# Patient Record
Sex: FEMALE | Race: BLACK OR AFRICAN AMERICAN | NOT HISPANIC OR LATINO | Employment: OTHER | URBAN - METROPOLITAN AREA
[De-identification: names, ages, dates, MRNs, and addresses within clinical notes are randomized per-mention and may not be internally consistent; named-entity substitution may affect disease eponyms.]

---

## 2023-03-06 ENCOUNTER — OFFICE VISIT (OUTPATIENT)
Dept: PRIMARY CARE | Facility: CLINIC | Age: 73
End: 2023-03-06
Payer: COMMERCIAL

## 2023-03-06 VITALS
OXYGEN SATURATION: 98 % | BODY MASS INDEX: 28.93 KG/M2 | HEART RATE: 103 BPM | DIASTOLIC BLOOD PRESSURE: 94 MMHG | SYSTOLIC BLOOD PRESSURE: 161 MMHG | HEIGHT: 62 IN | TEMPERATURE: 97.7 F | WEIGHT: 157.2 LBS

## 2023-03-06 DIAGNOSIS — R73.09 ELEVATED GLUCOSE: ICD-10-CM

## 2023-03-06 DIAGNOSIS — I10 HYPERTENSION, UNSPECIFIED TYPE: ICD-10-CM

## 2023-03-06 DIAGNOSIS — Z76.89 ESTABLISHING CARE WITH NEW DOCTOR, ENCOUNTER FOR: Primary | ICD-10-CM

## 2023-03-06 LAB
ALANINE AMINOTRANSFERASE (SGPT) (U/L) IN SER/PLAS: 14 U/L (ref 7–45)
ALBUMIN (G/DL) IN SER/PLAS: 4.3 G/DL (ref 3.4–5)
ALKALINE PHOSPHATASE (U/L) IN SER/PLAS: 95 U/L (ref 33–136)
ANION GAP IN SER/PLAS: 11 MMOL/L (ref 10–20)
ASPARTATE AMINOTRANSFERASE (SGOT) (U/L) IN SER/PLAS: 17 U/L (ref 9–39)
BILIRUBIN TOTAL (MG/DL) IN SER/PLAS: 0.6 MG/DL (ref 0–1.2)
CALCIUM (MG/DL) IN SER/PLAS: 9.9 MG/DL (ref 8.6–10.6)
CARBON DIOXIDE, TOTAL (MMOL/L) IN SER/PLAS: 30 MMOL/L (ref 21–32)
CHLORIDE (MMOL/L) IN SER/PLAS: 106 MMOL/L (ref 98–107)
CHOLESTEROL (MG/DL) IN SER/PLAS: 166 MG/DL (ref 0–199)
CHOLESTEROL IN HDL (MG/DL) IN SER/PLAS: 44.3 MG/DL
CHOLESTEROL/HDL RATIO: 3.7
CREATININE (MG/DL) IN SER/PLAS: 0.72 MG/DL (ref 0.5–1.05)
ERYTHROCYTE DISTRIBUTION WIDTH (RATIO) BY AUTOMATED COUNT: 12.8 % (ref 11.5–14.5)
ERYTHROCYTE MEAN CORPUSCULAR HEMOGLOBIN CONCENTRATION (G/DL) BY AUTOMATED: 29.8 G/DL (ref 32–36)
ERYTHROCYTE MEAN CORPUSCULAR VOLUME (FL) BY AUTOMATED COUNT: 95 FL (ref 80–100)
ERYTHROCYTES (10*6/UL) IN BLOOD BY AUTOMATED COUNT: 3.97 X10E12/L (ref 4–5.2)
GFR FEMALE: 89 ML/MIN/1.73M2
GLUCOSE (MG/DL) IN SER/PLAS: 146 MG/DL (ref 74–99)
HEMATOCRIT (%) IN BLOOD BY AUTOMATED COUNT: 37.6 % (ref 36–46)
HEMOGLOBIN (G/DL) IN BLOOD: 11.2 G/DL (ref 12–16)
LDL: 96 MG/DL (ref 0–99)
LEUKOCYTES (10*3/UL) IN BLOOD BY AUTOMATED COUNT: 7.2 X10E9/L (ref 4.4–11.3)
MAGNESIUM (MG/DL) IN SER/PLAS: 2.04 MG/DL (ref 1.6–2.4)
NRBC (PER 100 WBCS) BY AUTOMATED COUNT: 0 /100 WBC (ref 0–0)
PLATELETS (10*3/UL) IN BLOOD AUTOMATED COUNT: 246 X10E9/L (ref 150–450)
POTASSIUM (MMOL/L) IN SER/PLAS: 3.9 MMOL/L (ref 3.5–5.3)
PROTEIN TOTAL: 6.9 G/DL (ref 6.4–8.2)
SODIUM (MMOL/L) IN SER/PLAS: 143 MMOL/L (ref 136–145)
THYROTROPIN (MIU/L) IN SER/PLAS BY DETECTION LIMIT <= 0.05 MIU/L: 3.58 MIU/L (ref 0.44–3.98)
TRIGLYCERIDE (MG/DL) IN SER/PLAS: 130 MG/DL (ref 0–149)
UREA NITROGEN (MG/DL) IN SER/PLAS: 16 MG/DL (ref 6–23)
VLDL: 26 MG/DL (ref 0–40)

## 2023-03-06 PROCEDURE — 3008F BODY MASS INDEX DOCD: CPT

## 2023-03-06 PROCEDURE — 85027 COMPLETE CBC AUTOMATED: CPT

## 2023-03-06 PROCEDURE — 80053 COMPREHEN METABOLIC PANEL: CPT

## 2023-03-06 PROCEDURE — 80061 LIPID PANEL: CPT

## 2023-03-06 PROCEDURE — 3077F SYST BP >= 140 MM HG: CPT

## 2023-03-06 PROCEDURE — 3080F DIAST BP >= 90 MM HG: CPT

## 2023-03-06 PROCEDURE — 83735 ASSAY OF MAGNESIUM: CPT

## 2023-03-06 PROCEDURE — 84443 ASSAY THYROID STIM HORMONE: CPT

## 2023-03-06 PROCEDURE — 36415 COLL VENOUS BLD VENIPUNCTURE: CPT

## 2023-03-06 PROCEDURE — 99204 OFFICE O/P NEW MOD 45 MIN: CPT

## 2023-03-06 RX ORDER — CHLORTHALIDONE 25 MG/1
12.5 TABLET ORAL DAILY
Qty: 15 TABLET | Refills: 0 | Status: SHIPPED | OUTPATIENT
Start: 2023-03-06 | End: 2023-04-24 | Stop reason: SDUPTHER

## 2023-03-06 ASSESSMENT — ENCOUNTER SYMPTOMS
LIGHT-HEADEDNESS: 1
JOINT SWELLING: 1
HEADACHES: 1

## 2023-03-06 NOTE — PROGRESS NOTES
"Subjective   Patient ID: Betsy Ragland is a 72 y.o. female who presents for establishing care as well as hypertension    HPI  Betsy is presenting today to establish care due to distance to previous provider as well as concerns for running out of her hypertension medication/pharmacy losing her prescription. She has only been on losartan 100mg every day for one week with home readings or 200/100  She presented to the ED recently for concerns of a possible MI due to left sided finger pain that radiates all the way to neck and shoulder.   She was discharged with no concerns of MI however with possible finger pain etiology of a past fracture that healed incorrectly causing joint pain. She already has a referral to a surgeon for follow up      Review of Systems   Musculoskeletal:  Positive for joint swelling.   Neurological:  Positive for light-headedness and headaches.   All other systems have been reviewed and are negative.     BP (!) 161/94 (BP Location: Right arm)   Pulse 103   Temp 36.5 °C (97.7 °F) (Temporal)   Ht 1.585 m (5' 2.4\")   Wt 71.3 kg (157 lb 3.2 oz)   SpO2 98%   BMI 28.38 kg/m²   Objective   Physical Exam  Constitutional:       Appearance: Normal appearance.   HENT:      Head: Normocephalic and atraumatic.      Right Ear: External ear normal.      Left Ear: External ear normal.      Nose: Nose normal.   Eyes:      Extraocular Movements: Extraocular movements intact.      Conjunctiva/sclera: Conjunctivae normal.   Cardiovascular:      Rate and Rhythm: Normal rate and regular rhythm.      Pulses: Normal pulses.      Heart sounds: Normal heart sounds.   Pulmonary:      Effort: Pulmonary effort is normal.      Breath sounds: Normal breath sounds.   Abdominal:      General: Abdomen is flat. Bowel sounds are normal.      Palpations: Abdomen is soft.   Musculoskeletal:         General: Normal range of motion.      Cervical back: Normal range of motion.      Comments: Left middle finger deformity    Skin:     " General: Skin is warm and dry.      Capillary Refill: Capillary refill takes less than 2 seconds.   Neurological:      General: No focal deficit present.      Mental Status: She is alert and oriented to person, place, and time.   Psychiatric:         Mood and Affect: Mood normal.         Behavior: Behavior normal.         Thought Content: Thought content normal.         Judgment: Judgment normal.         Assessment/Plan     Establish care:   Lab work ordered listed below. Will follow up with results and adjust medications, if necessary.     2. HTN, uncontrolled: has been out of meds.   Continue Losartan 100mg QD  Start Chlorthalidone 12.5mg QD  Start bp log for 1 week  Return to clinic on Friday for BP follow up and evaluation  If BP still high at that time, consider restarting Amlodipine.    I have personally reviewed all available pertinent labs, imaging, and consult notes with the patient.     All questions and concerns were addressed. Patient verbalizes understanding instructions and agrees with established plan of care.     Patient seen and discussed with Dr. Chase Cline MD     Problem List Items Addressed This Visit    None  Visit Diagnoses       Hypertension, unspecified type    -  Primary    Relevant Medications    chlorthalidone (Hygroton) 25 mg tablet    Other Relevant Orders    CBC    Comprehensive Metabolic Panel    Hemoglobin A1C    Lipid Panel    Magnesium    TSH with reflex to Free T4 if abnormal    Establishing care with new doctor, encounter for        Relevant Orders    CBC    Comprehensive Metabolic Panel    Hemoglobin A1C    Lipid Panel    Magnesium    TSH with reflex to Free T4 if abnormal    BMI 28.0-28.9,adult        Relevant Orders    CBC    Comprehensive Metabolic Panel    Hemoglobin A1C    Lipid Panel    Magnesium    TSH with reflex to Free T4 if abnormal    Elevated glucose        Relevant Orders    Hemoglobin A1C

## 2023-03-10 ENCOUNTER — OFFICE VISIT (OUTPATIENT)
Dept: PRIMARY CARE | Facility: CLINIC | Age: 73
End: 2023-03-10
Payer: COMMERCIAL

## 2023-03-10 VITALS
TEMPERATURE: 97.6 F | HEART RATE: 78 BPM | OXYGEN SATURATION: 99 % | RESPIRATION RATE: 16 BRPM | SYSTOLIC BLOOD PRESSURE: 150 MMHG | DIASTOLIC BLOOD PRESSURE: 90 MMHG

## 2023-03-10 DIAGNOSIS — I10 HYPERTENSION, UNSPECIFIED TYPE: Primary | ICD-10-CM

## 2023-03-10 PROCEDURE — 3008F BODY MASS INDEX DOCD: CPT

## 2023-03-10 PROCEDURE — 99213 OFFICE O/P EST LOW 20 MIN: CPT

## 2023-03-10 PROCEDURE — 3080F DIAST BP >= 90 MM HG: CPT

## 2023-03-10 PROCEDURE — 3077F SYST BP >= 140 MM HG: CPT

## 2023-03-10 RX ORDER — LOSARTAN POTASSIUM 100 MG/1
100 TABLET ORAL DAILY
COMMUNITY
Start: 2022-11-30 | End: 2023-05-05

## 2023-03-10 NOTE — PROGRESS NOTES
Subjective   Patient ID: Betsy Ragland is a 72 y.o. female who presents for BP follow up after starting a new regimen on Monday.     HPI     Betsy is presenting today for BP follow up, she brought her BP log and demonstrates a steady decrease in BP levels.       Review of Systems   All other systems reviewed and are negative.      Objective   There were no vitals taken for this visit.    Physical Exam  Constitutional:       Appearance: Normal appearance.   HENT:      Head: Normocephalic and atraumatic.      Right Ear: External ear normal.      Left Ear: External ear normal.      Nose: Nose normal. No congestion or rhinorrhea.   Eyes:      General: No scleral icterus.     Extraocular Movements: Extraocular movements intact.      Conjunctiva/sclera: Conjunctivae normal.   Cardiovascular:      Rate and Rhythm: Normal rate and regular rhythm.      Pulses: Normal pulses.      Heart sounds: Normal heart sounds.   Pulmonary:      Effort: Pulmonary effort is normal.   Musculoskeletal:         General: Normal range of motion.   Skin:     General: Skin is warm and dry.      Capillary Refill: Capillary refill takes less than 2 seconds.   Neurological:      General: No focal deficit present.      Mental Status: She is alert and oriented to person, place, and time. Mental status is at baseline.   Psychiatric:         Mood and Affect: Mood normal.         Behavior: Behavior normal.         Thought Content: Thought content normal.         Judgment: Judgment normal.         Assessment/Plan     Hypertension:   Continue the Losartan 100mg every day  Continue Chlorthalidone 12.5mg every day   Continue the BP log for one more week and return to clinic for evaluation for the continued decrease  Betsy does not feel any side effects such as dizziness, light headed or feelings of loss of consciousness  Discussed that should she should return to clinic should any side effects should occur     I have personally reviewed all available pertinent  labs, imaging, and consult notes with the patient.     All questions and concerns were addressed. Patient verbalizes understanding instructions and agrees with established plan of care.     Patient seen and discussed with Dr. Chase Cline MD

## 2023-03-17 ENCOUNTER — OFFICE VISIT (OUTPATIENT)
Dept: PRIMARY CARE | Facility: CLINIC | Age: 73
End: 2023-03-17
Payer: COMMERCIAL

## 2023-03-17 VITALS — SYSTOLIC BLOOD PRESSURE: 142 MMHG | HEART RATE: 95 BPM | DIASTOLIC BLOOD PRESSURE: 78 MMHG | OXYGEN SATURATION: 96 %

## 2023-03-17 DIAGNOSIS — I10 HYPERTENSION, UNSPECIFIED TYPE: Primary | ICD-10-CM

## 2023-03-17 PROCEDURE — 3008F BODY MASS INDEX DOCD: CPT

## 2023-03-17 PROCEDURE — 3078F DIAST BP <80 MM HG: CPT

## 2023-03-17 PROCEDURE — 3077F SYST BP >= 140 MM HG: CPT

## 2023-03-17 PROCEDURE — 99213 OFFICE O/P EST LOW 20 MIN: CPT

## 2023-03-17 NOTE — PROGRESS NOTES
Subjective   Patient ID: Betsy Ragland is a 72 y.o. female who presents for No chief complaint on file..    HPI   Betsy is presenting for BP follow up after last visit.   She presented with BP log that shows an average of 150/85   She is not experiencing any symptoms from the medication as well as following a low salt diet actively      Review of Systems   All other systems reviewed and are negative.      Objective   There were no vitals taken for this visit.    Physical Exam  Constitutional:       Appearance: Normal appearance.   HENT:      Head: Normocephalic and atraumatic.      Right Ear: External ear normal.      Left Ear: External ear normal.      Nose: Nose normal.   Eyes:      Extraocular Movements: Extraocular movements intact.      Conjunctiva/sclera: Conjunctivae normal.   Cardiovascular:      Rate and Rhythm: Normal rate and regular rhythm.      Pulses: Normal pulses.      Heart sounds: Normal heart sounds.   Pulmonary:      Effort: Pulmonary effort is normal.   Musculoskeletal:         General: Normal range of motion.      Cervical back: Normal range of motion.   Skin:     General: Skin is warm and dry.   Neurological:      General: No focal deficit present.      Mental Status: She is alert and oriented to person, place, and time.   Psychiatric:         Mood and Affect: Mood normal.         Behavior: Behavior normal.         Thought Content: Thought content normal.         Judgment: Judgment normal.         Assessment/Plan   Diagnoses and all orders for this visit:  Hypertension, unspecified type  -     Comprehensive metabolic panel; Future  -     Magnesium; Future       Follow up in 1 month with current regimen.   BP was 142/78 in clinic on repeat check.   She is not experiencing any symptoms on current regimen   Continue low salt diet  Labs to be rechecked in one month for continued monitoring after making antihypertensives.    I have personally reviewed all available pertinent labs, imaging, and  consult notes with the patient.     All questions and concerns were addressed. Patient verbalizes understanding instructions and agrees with established plan of care.     Patient seen and discussed with Dr. Chase Cline MD

## 2023-04-24 DIAGNOSIS — I10 HYPERTENSION, UNSPECIFIED TYPE: ICD-10-CM

## 2023-04-24 RX ORDER — CHLORTHALIDONE 25 MG/1
12.5 TABLET ORAL DAILY
Qty: 15 TABLET | Refills: 0 | Status: SHIPPED | OUTPATIENT
Start: 2023-04-24 | End: 2023-05-05

## 2023-05-05 ENCOUNTER — OFFICE VISIT (OUTPATIENT)
Dept: PRIMARY CARE | Facility: CLINIC | Age: 73
End: 2023-05-05
Payer: COMMERCIAL

## 2023-05-05 VITALS — SYSTOLIC BLOOD PRESSURE: 145 MMHG | DIASTOLIC BLOOD PRESSURE: 78 MMHG | HEART RATE: 83 BPM | OXYGEN SATURATION: 98 %

## 2023-05-05 DIAGNOSIS — I10 HYPERTENSION, UNSPECIFIED TYPE: Primary | ICD-10-CM

## 2023-05-05 PROCEDURE — 3077F SYST BP >= 140 MM HG: CPT

## 2023-05-05 PROCEDURE — 3078F DIAST BP <80 MM HG: CPT

## 2023-05-05 PROCEDURE — 3008F BODY MASS INDEX DOCD: CPT

## 2023-05-05 PROCEDURE — 1159F MED LIST DOCD IN RCRD: CPT

## 2023-05-05 PROCEDURE — 99213 OFFICE O/P EST LOW 20 MIN: CPT

## 2023-05-05 PROCEDURE — 1160F RVW MEDS BY RX/DR IN RCRD: CPT

## 2023-05-05 RX ORDER — OLMESARTAN MEDOXOMIL 40 MG/1
40 TABLET ORAL DAILY
Qty: 30 TABLET | Refills: 5 | Status: SHIPPED | OUTPATIENT
Start: 2023-05-05 | End: 2023-09-26 | Stop reason: ALTCHOICE

## 2023-05-05 NOTE — PROGRESS NOTES
Subjective   Patient ID: Betsy Ragland is a 72 y.o. female who presents for HTN follow up.    HPI     Betsy has been taking half a tablet of chlorthalidone and 100mg losartan regularly and has not run out of her medication.  She has ran out of the chlorthalidone last week. She measures her bp at home with her home machine, and checks the memory to compare her readings. She reports that her readings have been steady approximately 140s/70s.   No symptoms from dizziness, no syncopal symptoms, no loss of balance. She has been monitoring her weight and food intake to decrease salt intake.  Feels better in general since implementing a better diet and nutrition regimen.  Betsy has no new concerns to share, no pain.       Review of Systems   All other systems reviewed and are negative.      Objective   /78 (BP Location: Right arm, Patient Position: Sitting, BP Cuff Size: Adult)   Pulse 83   SpO2 98%     Physical Exam  Constitutional:       Appearance: Normal appearance.   HENT:      Head: Normocephalic and atraumatic.      Right Ear: External ear normal.      Left Ear: External ear normal.      Nose: Nose normal. No congestion or rhinorrhea.   Eyes:      General: No scleral icterus.     Extraocular Movements: Extraocular movements intact.      Conjunctiva/sclera: Conjunctivae normal.   Cardiovascular:      Rate and Rhythm: Normal rate and regular rhythm.      Pulses: Normal pulses.      Heart sounds: Normal heart sounds.   Pulmonary:      Effort: Pulmonary effort is normal.      Breath sounds: Normal breath sounds.   Musculoskeletal:         General: Normal range of motion.      Cervical back: Normal range of motion and neck supple.   Skin:     General: Skin is warm and dry.      Capillary Refill: Capillary refill takes less than 2 seconds.   Neurological:      General: No focal deficit present.      Mental Status: She is alert and oriented to person, place, and time. Mental status is at baseline.   Psychiatric:          Mood and Affect: Mood normal.         Behavior: Behavior normal.         Thought Content: Thought content normal.         Judgment: Judgment normal.         Assessment/Plan   Diagnoses and all orders for this visit:  Hypertension, unspecified type  -     olmesartan (BENIcar) 40 mg tablet; Take 1 tablet (40 mg) by mouth once daily.    Betsy's blood pressure has not changed with the 1 week of not taking her chlorthalidone and her blood pressure continues not to meet the target of below 140 systolic however her diastolic is meeting the goal.   Her previous regimen has been discontinued and starting her on Olmesartan 40mg daily.   We'd like to see her back in 1 month to monitor the change in her regimen.   Please continue to check your bp at home with the monitor and please let the office know should any new symptoms arise particularly dizziness, loss of balance or syncopal symptoms.    I have personally reviewed all available pertinent labs, imaging, and consult notes with the patient.     All questions and concerns were addressed. Patient verbalizes understanding instructions and agrees with established plan of care.     Patient seen and discussed with Dr. Stone

## 2023-09-26 ENCOUNTER — OFFICE VISIT (OUTPATIENT)
Dept: PRIMARY CARE | Facility: CLINIC | Age: 73
End: 2023-09-26
Payer: COMMERCIAL

## 2023-09-26 ENCOUNTER — LAB (OUTPATIENT)
Dept: LAB | Facility: LAB | Age: 73
End: 2023-09-26
Payer: COMMERCIAL

## 2023-09-26 VITALS
SYSTOLIC BLOOD PRESSURE: 173 MMHG | HEIGHT: 63 IN | OXYGEN SATURATION: 98 % | WEIGHT: 150.2 LBS | DIASTOLIC BLOOD PRESSURE: 90 MMHG | BODY MASS INDEX: 26.61 KG/M2 | RESPIRATION RATE: 16 BRPM | HEART RATE: 80 BPM

## 2023-09-26 DIAGNOSIS — I10 HYPERTENSION, UNSPECIFIED TYPE: Primary | ICD-10-CM

## 2023-09-26 DIAGNOSIS — T78.40XA ALLERGY, INITIAL ENCOUNTER: ICD-10-CM

## 2023-09-26 DIAGNOSIS — Z77.120 MOLD SUSPECTED EXPOSURE: ICD-10-CM

## 2023-09-26 DIAGNOSIS — I10 HYPERTENSION, UNSPECIFIED TYPE: ICD-10-CM

## 2023-09-26 DIAGNOSIS — Z51.89 ENCOUNTER FOR MEDICATION ADJUSTMENT: ICD-10-CM

## 2023-09-26 LAB
ALANINE AMINOTRANSFERASE (SGPT) (U/L) IN SER/PLAS: 11 U/L (ref 7–45)
ALBUMIN (G/DL) IN SER/PLAS: 4.3 G/DL (ref 3.4–5)
ALKALINE PHOSPHATASE (U/L) IN SER/PLAS: 80 U/L (ref 33–136)
ANION GAP IN SER/PLAS: 12 MMOL/L (ref 10–20)
ASPARTATE AMINOTRANSFERASE (SGOT) (U/L) IN SER/PLAS: 15 U/L (ref 9–39)
BILIRUBIN TOTAL (MG/DL) IN SER/PLAS: 0.7 MG/DL (ref 0–1.2)
CALCIUM (MG/DL) IN SER/PLAS: 10.2 MG/DL (ref 8.6–10.6)
CARBON DIOXIDE, TOTAL (MMOL/L) IN SER/PLAS: 29 MMOL/L (ref 21–32)
CHLORIDE (MMOL/L) IN SER/PLAS: 108 MMOL/L (ref 98–107)
CREATININE (MG/DL) IN SER/PLAS: 0.8 MG/DL (ref 0.5–1.05)
GFR FEMALE: 78 ML/MIN/1.73M2
GLUCOSE (MG/DL) IN SER/PLAS: 97 MG/DL (ref 74–99)
MAGNESIUM (MG/DL) IN SER/PLAS: 2.2 MG/DL (ref 1.6–2.4)
POTASSIUM (MMOL/L) IN SER/PLAS: 4.4 MMOL/L (ref 3.5–5.3)
PROTEIN TOTAL: 6.8 G/DL (ref 6.4–8.2)
SODIUM (MMOL/L) IN SER/PLAS: 145 MMOL/L (ref 136–145)
UREA NITROGEN (MG/DL) IN SER/PLAS: 21 MG/DL (ref 6–23)

## 2023-09-26 PROCEDURE — 1160F RVW MEDS BY RX/DR IN RCRD: CPT | Performed by: STUDENT IN AN ORGANIZED HEALTH CARE EDUCATION/TRAINING PROGRAM

## 2023-09-26 PROCEDURE — 3080F DIAST BP >= 90 MM HG: CPT | Performed by: STUDENT IN AN ORGANIZED HEALTH CARE EDUCATION/TRAINING PROGRAM

## 2023-09-26 PROCEDURE — 1159F MED LIST DOCD IN RCRD: CPT | Performed by: STUDENT IN AN ORGANIZED HEALTH CARE EDUCATION/TRAINING PROGRAM

## 2023-09-26 PROCEDURE — 80053 COMPREHEN METABOLIC PANEL: CPT

## 2023-09-26 PROCEDURE — 83735 ASSAY OF MAGNESIUM: CPT

## 2023-09-26 PROCEDURE — 99214 OFFICE O/P EST MOD 30 MIN: CPT | Performed by: STUDENT IN AN ORGANIZED HEALTH CARE EDUCATION/TRAINING PROGRAM

## 2023-09-26 PROCEDURE — 3008F BODY MASS INDEX DOCD: CPT | Performed by: STUDENT IN AN ORGANIZED HEALTH CARE EDUCATION/TRAINING PROGRAM

## 2023-09-26 PROCEDURE — 3077F SYST BP >= 140 MM HG: CPT | Performed by: STUDENT IN AN ORGANIZED HEALTH CARE EDUCATION/TRAINING PROGRAM

## 2023-09-26 PROCEDURE — 36415 COLL VENOUS BLD VENIPUNCTURE: CPT

## 2023-09-26 PROCEDURE — 86003 ALLG SPEC IGE CRUDE XTRC EA: CPT

## 2023-09-26 PROCEDURE — 82785 ASSAY OF IGE: CPT

## 2023-09-26 RX ORDER — LOSARTAN POTASSIUM 25 MG/1
25 TABLET ORAL DAILY
Qty: 90 TABLET | Refills: 0 | Status: SHIPPED | OUTPATIENT
Start: 2023-09-26 | End: 2024-06-10 | Stop reason: WASHOUT

## 2023-09-26 RX ORDER — AMLODIPINE BESYLATE 10 MG/1
10 TABLET ORAL DAILY
Qty: 90 TABLET | Refills: 1 | Status: SHIPPED | OUTPATIENT
Start: 2023-09-26 | End: 2024-06-10 | Stop reason: WASHOUT

## 2023-09-26 NOTE — PROGRESS NOTES
"Subjective   Patient ID: Betsy Ragland is a 73 y.o. female who presents for No chief complaint on file..  HPI  Patient here for BP meds follow up. States she is currently on Amlodipine 10mg every day and that she ran out of her other medication but is unable to tell me which other medication. Reports BP has been running around 160-170s/80-90s. She does admit to being overly sensitive to medications and occasional lightheadedness from BP meds.    Also has recent exposure to mold and would like to be tested for possible allergies to mold.    Denies new onset headaches, fever, chills, n/v/d, chest pain, SOB, abdominal pain, urinary symptoms, and lower extremity edema.     Review of Systems  All other systems have been reviewed and are negative.    Visit Vitals  /90   Pulse 80   Resp 16   Ht 1.588 m (5' 2.5\")   Wt 68.1 kg (150 lb 3.2 oz)   SpO2 98%   BMI 27.03 kg/m²   BSA 1.73 m²       Objective   Physical Exam  General: Alert and oriented. Appears well-nourished and in no acute distress.  Eyes: PERRLA. EOMI.  Head/neck: Normocephalic. Supple.  Lymphatics: No cervical lymphadenopathy.  Respiratory/Thorax: Clear to auscultation bilaterally. No wheezing.   Cardiovascular: Regular rate and rhythm. No murmurs.  Gastrointestinal: Soft, nontender, nondistended. +BS   Musculoskeletal: ROM intact. No joint swelling. Normal strength   Extremities: Warm and well perfused. No peripheral edema.  Neurological: No gross neurologic deficits.   Psychological: Appropriate mood and affect.   Skin: No visible rashes or lesions.     Assessment/Plan     1. HTN, uncontrolled: Not meeting BP goal of < 130/80.   Medication changes made today: initiate Losartan 25mg every day. Ideally would want to start patient on Losartan 50mg every day but given patient's sensitivity to medications and occasional lightheadedness, will be more cautious and start with 25mg every day.  If BP is still elevated at the next visit, we will consider increase " Losartan to 50mg every day.  Side effects of medications discussed with patient, as well as signs and symptoms of hypotension, including dizziness, syncope, and lightheadedness. Patient is to call the office if they experience any of the mentioned side effects and/or display symptoms of low blood pressure. Encouraged following a healthy lifestyle, watching salt in diet (less than 2g of sodium a day), avoiding soy sauce and processed meat, and limiting soda drinks, as well as exercising regularly. No smoking. Recommend checking blood pressure daily - same time of the day - and writing the numbers down. Advised to bring BP log to the next appointment.  F/U in 2-3 weeks for HTN follow up and will need a MOCA.    No red flags.     Problem List Items Addressed This Visit    None  Visit Diagnoses       Hypertension, unspecified type    -  Primary    Relevant Medications    amLODIPine (Norvasc) 10 mg tablet    losartan (Cozaar) 25 mg tablet    Mold suspected exposure        Relevant Orders    Respiratory Allergy Profile IgE    Allergy, initial encounter        Relevant Orders    Respiratory Allergy Profile IgE            I have personally reviewed all available pertinent labs, imaging, and consult notes with the patient.     All questions and concerns were addressed. Patient verbalizes understanding instructions and agrees with established plan of care.     Emory Stone MD, MS  Family Medicine  [facility]

## 2023-09-27 ENCOUNTER — TELEPHONE (OUTPATIENT)
Dept: PRIMARY CARE | Facility: CLINIC | Age: 73
End: 2023-09-27
Payer: COMMERCIAL

## 2023-09-27 LAB
ALLERGEN ANIMAL: CAT DANDER IGE (KU/L): <0.1 KU/L
ALLERGEN ANIMAL: DOG DANDER IGE (KU/L): <0.1 KU/L
ALLERGEN GRASS: BERMUDA GRASS (CYNODON DACTYLON) IGE (KU/L): <0.1 KU/L
ALLERGEN GRASS: JOHNSON GRASS (SORGHUM HALEPENSE) IGE (KU/L): <0.1 KU/L
ALLERGEN GRASS: MEADOW GRASS, KENTUCKY BLUE (POA PRATENSIS )IGE (KU/L): <0.1 KU/L
ALLERGEN GRASS: TIMOTHY GRASS (PHLEUM PRATENSE) IGE (KU/L): <0.1 KU/L
ALLERGEN INSECT: COCKROACH IGE: <0.1 KU/L
ALLERGEN MICROORGANISM: ALTERNARIA ALTERNATA IGE (KU/L): <0.1 KU/L
ALLERGEN MICROORGANISM: ASPERGILLUS FUMIGATUS IGE (KU/L): <0.1 KU/L
ALLERGEN MICROORGANISM: CLADOSPORIUM HERBARUM IGE (KU/L): <0.1 KU/L
ALLERGEN MICROORGANISM: PENICILLIUM CHRYSOGENUM (P. NOTATUM) IGE (KU/L): <0.1 KU/L
ALLERGEN MITE: DERMATOPHAGOIDES FARINAE (HOUSE DUST MITE) IGE (KU/L): <0.1 KU/L
ALLERGEN MITE: DERMATOPHAGOIDES PTERONYSSINUS (HOUSE DUST MITE) IGE (KU/L): <0.1 KU/L
ALLERGEN TREE: BOX-ELDER (ACER NEGUNDO) IGE (KU/L): <0.1 KU/L
ALLERGEN TREE: COMMON SILVER BIRCH (BETULA VERRUCOSA) IGE (KU/L): <0.1 KU/L
ALLERGEN TREE: COTTONWOOD (POPULUS DELTOIDES) IGE (KU/L): <0.1 KU/L
ALLERGEN TREE: ELM (ULMUS AMERICANA) IGE (KU/L): <0.1 KU/L
ALLERGEN TREE: MAPLE LEAF SYCAMORE, LONDON PLANE IGE (KU/L): <0.1 KU/L
ALLERGEN TREE: MOUNTAIN JUNIPER (JUNIPERUS SABINOIDES) IGE (KU/L): <0.1 KU/L
ALLERGEN TREE: MULBERRY (MORUS ALBA) IGE (KU/L): <0.1 KU/L
ALLERGEN TREE: OAK (QUERCUS ALBA) IGE (KU/L): <0.1 KU/L
ALLERGEN TREE: PECAN, HICKORY (CARYA PECAN) IGE (KU/L): <0.1 KU/L
ALLERGEN TREE: WALNUT IGE: <0.1 KU/L
ALLERGEN TREE: WHITE ASH (FRAXINUS AMERICANA) IGE (KU/L): <0.1 KU/L
ALLERGEN WEED: COMMON PIGWEED (AMARANTHUS RETROFLEXUS) IGE (KU/L): <0.1 KU/L
ALLERGEN WEED: COMMON RAGWEED (AMB. ARTEMISIIFOLIA/A. ELATIOR) IGE (KU/L): <0.1 KU/L
ALLERGEN WEED: GOOSEFOOT, LAMB'S QUARTERS (CHENOPODIUM ALBUM) IGE (KU/L): <0.1 KU/L
ALLERGEN WEED: PLANTAIN (ENGLISH), RIBWORT (PLANTAGO LANCEOLATA) IGE (KU/L): <0.1 KU/L
ALLERGEN WEED: PRICKLY SALTWORT/RUSSIAN THISTLE (SALSOLA KALI) IGE (KU/L): <0.1 KU/L
ALLERGEN WEED: SHEEP SORREL (RUMEX ACETOSELLA) IGE (KU/L): <0.1 KU/L
IMMUNOCAP IGE: 21.3 KU/L (ref 0–214)
IMMUNOCAP INTERPRETATION: NORMAL

## 2024-04-02 ENCOUNTER — APPOINTMENT (OUTPATIENT)
Dept: PRIMARY CARE | Facility: CLINIC | Age: 74
End: 2024-04-02
Payer: COMMERCIAL

## 2024-06-10 ENCOUNTER — OFFICE VISIT (OUTPATIENT)
Dept: PRIMARY CARE | Facility: CLINIC | Age: 74
End: 2024-06-10
Payer: COMMERCIAL

## 2024-06-10 VITALS
BODY MASS INDEX: 28.53 KG/M2 | HEIGHT: 63 IN | WEIGHT: 161 LBS | SYSTOLIC BLOOD PRESSURE: 171 MMHG | DIASTOLIC BLOOD PRESSURE: 94 MMHG

## 2024-06-10 DIAGNOSIS — Z00.00 MEDICARE ANNUAL WELLNESS VISIT, SUBSEQUENT: Primary | ICD-10-CM

## 2024-06-10 DIAGNOSIS — I10 HYPERTENSION, UNSPECIFIED TYPE: ICD-10-CM

## 2024-06-10 DIAGNOSIS — Z12.31 SCREENING MAMMOGRAM FOR BREAST CANCER: ICD-10-CM

## 2024-06-10 DIAGNOSIS — R73.9 HYPERGLYCEMIA: ICD-10-CM

## 2024-06-10 DIAGNOSIS — Z12.11 SCREEN FOR COLON CANCER: ICD-10-CM

## 2024-06-10 PROCEDURE — 3080F DIAST BP >= 90 MM HG: CPT

## 2024-06-10 PROCEDURE — 99214 OFFICE O/P EST MOD 30 MIN: CPT

## 2024-06-10 PROCEDURE — 1160F RVW MEDS BY RX/DR IN RCRD: CPT

## 2024-06-10 PROCEDURE — 1159F MED LIST DOCD IN RCRD: CPT

## 2024-06-10 PROCEDURE — 1170F FXNL STATUS ASSESSED: CPT

## 2024-06-10 PROCEDURE — 1036F TOBACCO NON-USER: CPT

## 2024-06-10 PROCEDURE — 1123F ACP DISCUSS/DSCN MKR DOCD: CPT

## 2024-06-10 PROCEDURE — 1158F ADVNC CARE PLAN TLK DOCD: CPT

## 2024-06-10 PROCEDURE — G0439 PPPS, SUBSEQ VISIT: HCPCS

## 2024-06-10 PROCEDURE — 3077F SYST BP >= 140 MM HG: CPT

## 2024-06-10 RX ORDER — LOSARTAN POTASSIUM 50 MG/1
50 TABLET ORAL DAILY
Qty: 30 TABLET | Refills: 2 | Status: SHIPPED | OUTPATIENT
Start: 2024-06-10 | End: 2024-09-08

## 2024-06-10 RX ORDER — AMLODIPINE BESYLATE 5 MG/1
5 TABLET ORAL DAILY
Qty: 30 TABLET | Refills: 2 | Status: SHIPPED | OUTPATIENT
Start: 2024-06-10 | End: 2024-09-08

## 2024-06-10 ASSESSMENT — PATIENT HEALTH QUESTIONNAIRE - PHQ9
1. LITTLE INTEREST OR PLEASURE IN DOING THINGS: NOT AT ALL
2. FEELING DOWN, DEPRESSED OR HOPELESS: NOT AT ALL
SUM OF ALL RESPONSES TO PHQ9 QUESTIONS 1 AND 2: 0
1. LITTLE INTEREST OR PLEASURE IN DOING THINGS: NOT AT ALL
SUM OF ALL RESPONSES TO PHQ9 QUESTIONS 1 AND 2: 0
2. FEELING DOWN, DEPRESSED OR HOPELESS: NOT AT ALL

## 2024-06-10 ASSESSMENT — ACTIVITIES OF DAILY LIVING (ADL)
MANAGING_FINANCES: INDEPENDENT
DRESSING: INDEPENDENT
BATHING: INDEPENDENT
TAKING_MEDICATION: INDEPENDENT
DOING_HOUSEWORK: INDEPENDENT
GROCERY_SHOPPING: INDEPENDENT

## 2024-06-10 ASSESSMENT — LIFESTYLE VARIABLES
HOW OFTEN DO YOU HAVE SIX OR MORE DRINKS ON ONE OCCASION: NEVER
HOW OFTEN DO YOU HAVE A DRINK CONTAINING ALCOHOL: MONTHLY OR LESS

## 2024-07-03 ENCOUNTER — APPOINTMENT (OUTPATIENT)
Dept: PRIMARY CARE | Facility: CLINIC | Age: 74
End: 2024-07-03
Payer: COMMERCIAL

## 2024-09-16 DIAGNOSIS — I10 HYPERTENSION, UNSPECIFIED TYPE: ICD-10-CM

## 2024-09-16 RX ORDER — LOSARTAN POTASSIUM 50 MG/1
50 TABLET ORAL DAILY
Qty: 30 TABLET | Refills: 0 | Status: SHIPPED | OUTPATIENT
Start: 2024-09-16 | End: 2024-09-20 | Stop reason: SDUPTHER

## 2024-09-20 ENCOUNTER — LAB (OUTPATIENT)
Dept: LAB | Facility: LAB | Age: 74
End: 2024-09-20
Payer: COMMERCIAL

## 2024-09-20 DIAGNOSIS — I10 HYPERTENSION, UNSPECIFIED TYPE: ICD-10-CM

## 2024-09-20 DIAGNOSIS — R73.9 HYPERGLYCEMIA: ICD-10-CM

## 2024-09-20 LAB
ALBUMIN SERPL BCP-MCNC: 4.1 G/DL (ref 3.4–5)
ALP SERPL-CCNC: 88 U/L (ref 33–136)
ALT SERPL W P-5'-P-CCNC: 8 U/L (ref 7–45)
ANION GAP SERPL CALC-SCNC: 11 MMOL/L (ref 10–20)
AST SERPL W P-5'-P-CCNC: 15 U/L (ref 9–39)
BILIRUB SERPL-MCNC: 0.8 MG/DL (ref 0–1.2)
BUN SERPL-MCNC: 20 MG/DL (ref 6–23)
CALCIUM SERPL-MCNC: 9.8 MG/DL (ref 8.6–10.6)
CHLORIDE SERPL-SCNC: 107 MMOL/L (ref 98–107)
CO2 SERPL-SCNC: 29 MMOL/L (ref 21–32)
CREAT SERPL-MCNC: 0.82 MG/DL (ref 0.5–1.05)
EGFRCR SERPLBLD CKD-EPI 2021: 75 ML/MIN/1.73M*2
ERYTHROCYTE [DISTWIDTH] IN BLOOD BY AUTOMATED COUNT: 12.7 % (ref 11.5–14.5)
EST. AVERAGE GLUCOSE BLD GHB EST-MCNC: 94 MG/DL
GLUCOSE SERPL-MCNC: 102 MG/DL (ref 74–99)
HBA1C MFR BLD: 4.9 %
HCT VFR BLD AUTO: 38.3 % (ref 36–46)
HGB BLD-MCNC: 11.4 G/DL (ref 12–16)
MCH RBC QN AUTO: 28.1 PG (ref 26–34)
MCHC RBC AUTO-ENTMCNC: 29.8 G/DL (ref 32–36)
MCV RBC AUTO: 95 FL (ref 80–100)
NRBC BLD-RTO: 0 /100 WBCS (ref 0–0)
PLATELET # BLD AUTO: 221 X10*3/UL (ref 150–450)
POTASSIUM SERPL-SCNC: 4.2 MMOL/L (ref 3.5–5.3)
PROT SERPL-MCNC: 6.7 G/DL (ref 6.4–8.2)
RBC # BLD AUTO: 4.05 X10*6/UL (ref 4–5.2)
SODIUM SERPL-SCNC: 143 MMOL/L (ref 136–145)
WBC # BLD AUTO: 5 X10*3/UL (ref 4.4–11.3)

## 2024-09-20 PROCEDURE — 83036 HEMOGLOBIN GLYCOSYLATED A1C: CPT

## 2024-09-20 PROCEDURE — 80053 COMPREHEN METABOLIC PANEL: CPT

## 2024-09-20 PROCEDURE — 36415 COLL VENOUS BLD VENIPUNCTURE: CPT

## 2024-09-20 PROCEDURE — 85027 COMPLETE CBC AUTOMATED: CPT

## 2024-09-20 RX ORDER — LOSARTAN POTASSIUM 50 MG/1
50 TABLET ORAL DAILY
Qty: 30 TABLET | Refills: 0 | Status: SHIPPED | OUTPATIENT
Start: 2024-09-20 | End: 2024-10-20

## 2024-09-24 ENCOUNTER — LAB (OUTPATIENT)
Dept: LAB | Facility: LAB | Age: 74
End: 2024-09-24
Payer: COMMERCIAL

## 2024-09-24 DIAGNOSIS — I10 HYPERTENSION, UNSPECIFIED TYPE: ICD-10-CM

## 2024-09-24 LAB
CREAT UR-MCNC: 110.9 MG/DL (ref 20–320)
MICROALBUMIN UR-MCNC: <7 MG/L
MICROALBUMIN/CREAT UR: NORMAL MG/G{CREAT}

## 2024-09-24 PROCEDURE — 82570 ASSAY OF URINE CREATININE: CPT

## 2024-09-24 PROCEDURE — 82043 UR ALBUMIN QUANTITATIVE: CPT

## 2024-10-14 DIAGNOSIS — I10 HYPERTENSION, UNSPECIFIED TYPE: ICD-10-CM

## 2024-10-14 RX ORDER — AMLODIPINE BESYLATE 5 MG/1
5 TABLET ORAL DAILY
Qty: 30 TABLET | Refills: 0 | Status: SHIPPED | OUTPATIENT
Start: 2024-10-14

## 2024-10-16 ENCOUNTER — APPOINTMENT (OUTPATIENT)
Dept: PRIMARY CARE | Facility: CLINIC | Age: 74
End: 2024-10-16
Payer: COMMERCIAL

## 2024-10-29 ENCOUNTER — APPOINTMENT (OUTPATIENT)
Dept: PRIMARY CARE | Facility: CLINIC | Age: 74
End: 2024-10-29
Payer: COMMERCIAL

## 2024-11-01 ENCOUNTER — OFFICE VISIT (OUTPATIENT)
Dept: PRIMARY CARE | Facility: CLINIC | Age: 74
End: 2024-11-01
Payer: COMMERCIAL

## 2024-11-01 VITALS
DIASTOLIC BLOOD PRESSURE: 76 MMHG | HEIGHT: 63 IN | WEIGHT: 160 LBS | SYSTOLIC BLOOD PRESSURE: 155 MMHG | BODY MASS INDEX: 28.35 KG/M2

## 2024-11-01 DIAGNOSIS — Z23 NEED FOR PNEUMOCOCCAL 20-VALENT CONJUGATE VACCINATION: ICD-10-CM

## 2024-11-01 DIAGNOSIS — I10 HYPERTENSION, UNSPECIFIED TYPE: ICD-10-CM

## 2024-11-01 DIAGNOSIS — Z12.11 SCREEN FOR COLON CANCER: Primary | ICD-10-CM

## 2024-11-01 PROCEDURE — 3008F BODY MASS INDEX DOCD: CPT

## 2024-11-01 PROCEDURE — G2211 COMPLEX E/M VISIT ADD ON: HCPCS

## 2024-11-01 PROCEDURE — 3077F SYST BP >= 140 MM HG: CPT

## 2024-11-01 PROCEDURE — 99214 OFFICE O/P EST MOD 30 MIN: CPT

## 2024-11-01 PROCEDURE — 1123F ACP DISCUSS/DSCN MKR DOCD: CPT

## 2024-11-01 PROCEDURE — 1036F TOBACCO NON-USER: CPT

## 2024-11-01 PROCEDURE — 3078F DIAST BP <80 MM HG: CPT

## 2024-11-01 RX ORDER — AMLODIPINE BESYLATE 10 MG/1
10 TABLET ORAL DAILY
Qty: 90 TABLET | Refills: 1 | Status: SHIPPED | OUTPATIENT
Start: 2024-11-01 | End: 2025-04-30

## 2024-11-01 RX ORDER — LOSARTAN POTASSIUM 50 MG/1
50 TABLET ORAL DAILY
Qty: 90 TABLET | Refills: 1 | Status: SHIPPED | OUTPATIENT
Start: 2024-11-01 | End: 2025-04-30

## 2024-11-01 RX ORDER — LOSARTAN POTASSIUM 50 MG/1
50 TABLET ORAL DAILY
Qty: 90 TABLET | Refills: 1 | Status: SHIPPED | OUTPATIENT
Start: 2024-11-01 | End: 2024-11-01 | Stop reason: SDUPTHER

## 2024-11-01 RX ORDER — AMLODIPINE BESYLATE 10 MG/1
10 TABLET ORAL DAILY
Qty: 90 TABLET | Refills: 1 | Status: SHIPPED | OUTPATIENT
Start: 2024-11-01 | End: 2024-11-01 | Stop reason: SDUPTHER

## 2024-11-01 ASSESSMENT — PATIENT HEALTH QUESTIONNAIRE - PHQ9
1. LITTLE INTEREST OR PLEASURE IN DOING THINGS: NOT AT ALL
SUM OF ALL RESPONSES TO PHQ9 QUESTIONS 1 AND 2: 0
2. FEELING DOWN, DEPRESSED OR HOPELESS: NOT AT ALL

## 2024-11-01 ASSESSMENT — LIFESTYLE VARIABLES: HOW MANY STANDARD DRINKS CONTAINING ALCOHOL DO YOU HAVE ON A TYPICAL DAY: PATIENT DOES NOT DRINK

## 2024-11-22 ENCOUNTER — APPOINTMENT (OUTPATIENT)
Dept: RADIOLOGY | Facility: CLINIC | Age: 74
End: 2024-11-22
Payer: COMMERCIAL

## 2024-12-06 LAB — NONINV COLON CA DNA+OCC BLD SCRN STL QL: NORMAL

## 2024-12-10 ENCOUNTER — TELEMEDICINE (OUTPATIENT)
Dept: PRIMARY CARE | Facility: CLINIC | Age: 74
End: 2024-12-10
Payer: COMMERCIAL

## 2024-12-10 DIAGNOSIS — I10 PRIMARY HYPERTENSION: Primary | ICD-10-CM

## 2024-12-10 PROCEDURE — 1123F ACP DISCUSS/DSCN MKR DOCD: CPT

## 2024-12-10 PROCEDURE — 1160F RVW MEDS BY RX/DR IN RCRD: CPT

## 2024-12-10 PROCEDURE — 1036F TOBACCO NON-USER: CPT

## 2024-12-10 PROCEDURE — G2211 COMPLEX E/M VISIT ADD ON: HCPCS

## 2024-12-10 PROCEDURE — 1159F MED LIST DOCD IN RCRD: CPT

## 2024-12-10 PROCEDURE — 99214 OFFICE O/P EST MOD 30 MIN: CPT

## 2024-12-10 RX ORDER — LOSARTAN POTASSIUM 100 MG/1
100 TABLET ORAL DAILY
Qty: 90 TABLET | Refills: 1 | Status: SHIPPED | OUTPATIENT
Start: 2024-12-10 | End: 2025-06-08

## 2024-12-10 NOTE — PROGRESS NOTES
Subjective   Patient ID: Betsy Ragland is a 74 y.o. female who presents via virtual visit for Conjunctivitis (/).  An interactive audio and video telecommunication system which permits real time communications between the patient (at the originating site) and provider (at the distant site) was utilized to provide this telehealth service.    Verbal consent was requested and obtained from the patient on the day of encounter.  HPI  74 year old female with PMH of HTN presents today via virtual visit for pink eye    HTN: amlodipine 10mg, losartan 100mg     Woke up with headache and /92. Her left eye was red, feels she burst a blood vessel in her eye. States this has happened in the past when BP was elevated.     Took her BP medication. Now has no headache. Symptoms fully resolved. Vision not affected. No pain.     All systems have been reviewed and are negative for complaint other than those mentioned in the HPI.     Objective   Physical Exam  Constitutional:       Appearance: Normal appearance.   Pulmonary:      Effort: Pulmonary effort is normal.   Neurological:      General: No focal deficit present.      Mental Status: She is alert and oriented to person, place, and time.   Psychiatric:         Mood and Affect: Mood normal.         Behavior: Behavior normal.     Betsy was seen today for conjunctivitis.  Diagnoses and all orders for this visit:  Primary hypertension (Primary)  -     Discussed symptoms of HTN crisis, if BP elevated with symptoms of headache, dizziness, chest pain, vision changes, SOB, recommend ER asap  - Will increase dose of losartan to 100mg daily  - States she is checking BP once daily at home, usually 150s/80s. Occasionally in 140s. Will likely need to add additional medication such as diuretic but will monitor for 1 week on higher dose of losartan first.   - losartan (Cozaar) 100 mg tablet; Take 1 tablet (100 mg) by mouth once daily.    Keep regularly scheduled appointment in 2 weeks for  follow up and BP check.

## 2025-01-03 ENCOUNTER — APPOINTMENT (OUTPATIENT)
Dept: PRIMARY CARE | Facility: CLINIC | Age: 75
End: 2025-01-03
Payer: COMMERCIAL

## 2025-01-03 VITALS
WEIGHT: 164 LBS | HEIGHT: 63 IN | DIASTOLIC BLOOD PRESSURE: 84 MMHG | SYSTOLIC BLOOD PRESSURE: 157 MMHG | BODY MASS INDEX: 29.06 KG/M2

## 2025-01-03 DIAGNOSIS — I10 PRIMARY HYPERTENSION: Primary | ICD-10-CM

## 2025-01-03 PROCEDURE — 1160F RVW MEDS BY RX/DR IN RCRD: CPT

## 2025-01-03 PROCEDURE — 3077F SYST BP >= 140 MM HG: CPT

## 2025-01-03 PROCEDURE — 1036F TOBACCO NON-USER: CPT

## 2025-01-03 PROCEDURE — 1123F ACP DISCUSS/DSCN MKR DOCD: CPT

## 2025-01-03 PROCEDURE — 99214 OFFICE O/P EST MOD 30 MIN: CPT

## 2025-01-03 PROCEDURE — 1159F MED LIST DOCD IN RCRD: CPT

## 2025-01-03 PROCEDURE — G2211 COMPLEX E/M VISIT ADD ON: HCPCS

## 2025-01-03 PROCEDURE — 3008F BODY MASS INDEX DOCD: CPT

## 2025-01-03 PROCEDURE — 3079F DIAST BP 80-89 MM HG: CPT

## 2025-01-03 ASSESSMENT — PATIENT HEALTH QUESTIONNAIRE - PHQ9
2. FEELING DOWN, DEPRESSED OR HOPELESS: NOT AT ALL
1. LITTLE INTEREST OR PLEASURE IN DOING THINGS: NOT AT ALL
SUM OF ALL RESPONSES TO PHQ9 QUESTIONS 1 AND 2: 0

## 2025-01-03 NOTE — PATIENT INSTRUCTIONS
Tracey Jordan MD & Peter Titus MD   [facility]   [address], [address]   [place] [address]   Phone: [phone]    Marcela Brizuela MD   [facility]   [address], [address]   [place] [address]   Phone: [phone]

## 2025-01-03 NOTE — PROGRESS NOTES
"Subjective   Patient ID: Betsy Ragland is a 74 y.o. female who presents for Follow-up.  HPI  74 year old female with PMH of HTN presents today for follow up.  HTN: amlodipine 10mg, edarbyclor 40-25mg     Checking BP at home, usually 150s/80s. Since starting losartan has been having headaches. Trying to limit sodium.     All systems have been reviewed and are negative for complaint other than those mentioned in the HPI.     Objective   /84   Ht 1.588 m (5' 2.5\")   Wt 74.4 kg (164 lb)   BMI 29.52 kg/m²    Physical Exam  Constitutional:       General: She is awake.      Appearance: Normal appearance.   HENT:      Head: Normocephalic and atraumatic.   Eyes:      Extraocular Movements: Extraocular movements intact.      Pupils: Pupils are equal, round, and reactive to light.   Cardiovascular:      Rate and Rhythm: Normal rate and regular rhythm.      Heart sounds: S1 normal and S2 normal. No murmur heard.  Pulmonary:      Effort: Pulmonary effort is normal.      Breath sounds: Normal breath sounds.   Musculoskeletal:      Cervical back: Normal range of motion and neck supple.      Right lower leg: No edema.      Left lower leg: No edema.   Skin:     General: Skin is warm and dry.   Neurological:      General: No focal deficit present.      Mental Status: She is alert and oriented to person, place, and time.   Psychiatric:         Mood and Affect: Mood and affect normal.         Behavior: Behavior normal. Behavior is cooperative.         Thought Content: Thought content normal.         Judgment: Judgment normal.     Betsy was seen today for follow-up.  Diagnoses and all orders for this visit:  Primary hypertension (Primary)  -     ABOVE goal  - Continue amlodipine 10mg  - STOP losartan 100mg as it is causing headaches  - Start edarbyclor  - Monitor BP at home, keep a log, bring to next appointment. Goal <130/80. If routinely above that, patient to call office.   - azilsartan-chlorthalidone (Edarbyclor) 40-25 mg " tablet tablet; Take 1 tablet by mouth once daily.

## 2025-01-06 LAB — NONINV COLON CA DNA+OCC BLD SCRN STL QL: NEGATIVE

## 2025-01-08 ENCOUNTER — TELEPHONE (OUTPATIENT)
Dept: PRIMARY CARE | Facility: CLINIC | Age: 75
End: 2025-01-08
Payer: COMMERCIAL

## 2025-01-08 DIAGNOSIS — I10 PRIMARY HYPERTENSION: Primary | ICD-10-CM

## 2025-01-08 RX ORDER — CHLORTHALIDONE 25 MG/1
25 TABLET ORAL DAILY
Qty: 90 TABLET | Refills: 1 | Status: SHIPPED | OUTPATIENT
Start: 2025-01-08 | End: 2025-07-07

## 2025-01-08 RX ORDER — OLMESARTAN MEDOXOMIL 20 MG/1
20 TABLET ORAL DAILY
Qty: 90 TABLET | Refills: 1 | Status: SHIPPED | OUTPATIENT
Start: 2025-01-08 | End: 2025-07-07

## 2025-03-13 ENCOUNTER — TELEPHONE (OUTPATIENT)
Dept: PRIMARY CARE | Facility: CLINIC | Age: 75
End: 2025-03-13
Payer: COMMERCIAL

## 2025-03-13 NOTE — TELEPHONE ENCOUNTER
Patient called and stated she was referred by Krystle.  She wants to know would you take her in as a new patient?

## 2025-04-15 ENCOUNTER — APPOINTMENT (OUTPATIENT)
Dept: PRIMARY CARE | Facility: CLINIC | Age: 75
End: 2025-04-15
Payer: COMMERCIAL

## 2025-04-15 VITALS
HEIGHT: 63 IN | BODY MASS INDEX: 27.64 KG/M2 | WEIGHT: 156 LBS | DIASTOLIC BLOOD PRESSURE: 79 MMHG | SYSTOLIC BLOOD PRESSURE: 166 MMHG | HEART RATE: 79 BPM

## 2025-04-15 DIAGNOSIS — I10 PRIMARY HYPERTENSION: Primary | ICD-10-CM

## 2025-04-15 DIAGNOSIS — E55.9 VITAMIN D DEFICIENCY: ICD-10-CM

## 2025-04-15 DIAGNOSIS — Z13.29 SCREENING FOR THYROID DISORDER: ICD-10-CM

## 2025-04-15 DIAGNOSIS — Z13.220 SCREENING FOR HYPERLIPIDEMIA: ICD-10-CM

## 2025-04-15 DIAGNOSIS — Z13.820 SCREENING FOR OSTEOPOROSIS: ICD-10-CM

## 2025-04-15 DIAGNOSIS — M81.0 AGE-RELATED OSTEOPOROSIS WITHOUT CURRENT PATHOLOGICAL FRACTURE: ICD-10-CM

## 2025-04-15 DIAGNOSIS — Z12.31 SCREENING MAMMOGRAM FOR BREAST CANCER: ICD-10-CM

## 2025-04-15 PROCEDURE — 99214 OFFICE O/P EST MOD 30 MIN: CPT | Performed by: INTERNAL MEDICINE

## 2025-04-15 PROCEDURE — 1036F TOBACCO NON-USER: CPT | Performed by: INTERNAL MEDICINE

## 2025-04-15 PROCEDURE — 3008F BODY MASS INDEX DOCD: CPT | Performed by: INTERNAL MEDICINE

## 2025-04-15 PROCEDURE — G2211 COMPLEX E/M VISIT ADD ON: HCPCS | Performed by: INTERNAL MEDICINE

## 2025-04-15 PROCEDURE — 3077F SYST BP >= 140 MM HG: CPT | Performed by: INTERNAL MEDICINE

## 2025-04-15 PROCEDURE — 1159F MED LIST DOCD IN RCRD: CPT | Performed by: INTERNAL MEDICINE

## 2025-04-15 PROCEDURE — 1123F ACP DISCUSS/DSCN MKR DOCD: CPT | Performed by: INTERNAL MEDICINE

## 2025-04-15 PROCEDURE — 3078F DIAST BP <80 MM HG: CPT | Performed by: INTERNAL MEDICINE

## 2025-04-15 RX ORDER — LOSARTAN POTASSIUM 50 MG/1
50 TABLET ORAL DAILY
Qty: 30 TABLET | Refills: 1 | Status: SHIPPED | OUTPATIENT
Start: 2025-04-15 | End: 2025-06-14

## 2025-04-15 ASSESSMENT — ENCOUNTER SYMPTOMS
ABDOMINAL PAIN: 0
SHORTNESS OF BREATH: 0

## 2025-04-15 ASSESSMENT — LIFESTYLE VARIABLES
HOW OFTEN DO YOU HAVE SIX OR MORE DRINKS ON ONE OCCASION: NEVER
AUDIT-C TOTAL SCORE: 1
SKIP TO QUESTIONS 9-10: 1
HOW OFTEN DO YOU HAVE A DRINK CONTAINING ALCOHOL: MONTHLY OR LESS
HOW MANY STANDARD DRINKS CONTAINING ALCOHOL DO YOU HAVE ON A TYPICAL DAY: 1 OR 2

## 2025-04-15 NOTE — PROGRESS NOTES
"Subjective   Patient ID: Betsy Ragland is a 74 y.o. female who presents for Follow-up.    HPI   73 y/o FM with PMHx of HTN presents for a follow up visit. Pt reports BP at home usually sits around 140/75 at home. She states she is compliant with her medications. Pt explains in February she lost her grandson 2/2 suicide so she has a decreased appetite. She reports she has been eating out often since grandson's death 2/2 no desire to cook. She reports she opts for healthy food options like vegetables, protein. She reports she avoids snacking on junk food. 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 She reports she sleeps 7 hours per night. She has no complaints today.     Review of Systems   Respiratory:  Negative for shortness of breath.    Cardiovascular:  Negative for chest pain and leg swelling.   Gastrointestinal:  Negative for abdominal pain.   All other systems reviewed and are negative.      Objective   /76 (BP Location: Right arm, Patient Position: Sitting, BP Cuff Size: Adult)   Pulse 79   Ht 1.588 m (5' 2.5\")   Wt 70.8 kg (156 lb)   BMI 28.08 kg/m²     Physical Exam  Vitals and nursing note reviewed.   Constitutional:       Appearance: Normal appearance.   Cardiovascular:      Rate and Rhythm: Normal rate and regular rhythm.   Pulmonary:      Effort: Pulmonary effort is normal.      Breath sounds: Normal breath sounds.   Abdominal:      Palpations: Abdomen is soft.   Neurological:      Mental Status: She is alert.   Psychiatric:         Mood and Affect: Mood normal.         Behavior: Behavior normal.         Assessment/Plan   Problem List Items Addressed This Visit       Primary hypertension - Primary    Relevant Medications    losartan (Cozaar) 50 mg tablet    Other Relevant Orders    Albumin-Creatinine Ratio, Urine Random    Comprehensive Metabolic Panel     Other Visit Diagnoses       Screening for hyperlipidemia        Relevant Orders    Lipid Panel    CT cardiac " scoring wo IV contrast    Screening mammogram for breast cancer        Relevant Orders    BI mammo bilateral screening tomosynthesis    Screening for osteoporosis        Relevant Orders    XR DEXA bone density    Vitamin D deficiency        Relevant Orders    Vitamin D 25-Hydroxy,Total (for eval of Vitamin D levels)    Screening for thyroid disorder        Relevant Orders    TSH with reflex to Free T4 if abnormal    Age-related osteoporosis without current pathological fracture        Relevant Orders    XR DEXA bone density          73 y/o FM with PMHx of HTN presents for a follow up visit.    #HTN  - BP: 166/79  - Pt reports she is compliant with Amlodipine and Chlorthalidone. She reports she wasn't able to get Edarbyclor 2/2 insurance coverage  - Pt reports keeping a log at home with BP around 140/70s  - Prescribed Losartan 50mg daily. Advised to take Chlorthalidone and Losartan in AM and Amlodipine PM  - CMP, urine albumin ordered today  - [facility]     #Overweight  - BMI:28.08, Weight: 156  - Pt reports she has a lack of appetite since February which has contributed to her weight loss   - Encouraged pt to eat healthy meals throughout the day including high protein foods and continue to avoid junk food   - Advised continued physical activity   - WC     Health Maintenance  - Lipid panel, TSH w reflex, Vitamin D  - Dexa scan, mammogram, CT cardiac scoring ordered today   - Wellness visit scheduled for June 25'    Michael Rivero DO  Internal Medicine, PGY-1

## 2025-04-15 NOTE — PROGRESS NOTES

## 2025-04-16 LAB
25(OH)D3+25(OH)D2 SERPL-MCNC: 26 NG/ML (ref 30–100)
ALBUMIN SERPL-MCNC: 4.4 G/DL (ref 3.6–5.1)
ALBUMIN/CREAT UR: 18 MG/G CREAT
ALP SERPL-CCNC: 75 U/L (ref 37–153)
ALT SERPL-CCNC: 9 U/L (ref 6–29)
ANION GAP SERPL CALCULATED.4IONS-SCNC: 8 MMOL/L (CALC) (ref 7–17)
AST SERPL-CCNC: 14 U/L (ref 10–35)
BILIRUB SERPL-MCNC: 0.7 MG/DL (ref 0.2–1.2)
BUN SERPL-MCNC: 16 MG/DL (ref 7–25)
CALCIUM SERPL-MCNC: 9.5 MG/DL (ref 8.6–10.4)
CHLORIDE SERPL-SCNC: 107 MMOL/L (ref 98–110)
CHOLEST SERPL-MCNC: 173 MG/DL
CHOLEST/HDLC SERPL: 3.1 (CALC)
CO2 SERPL-SCNC: 29 MMOL/L (ref 20–32)
CREAT SERPL-MCNC: 0.74 MG/DL (ref 0.6–1)
CREAT UR-MCNC: 106 MG/DL (ref 20–275)
EGFRCR SERPLBLD CKD-EPI 2021: 85 ML/MIN/1.73M2
GLUCOSE SERPL-MCNC: 115 MG/DL (ref 65–99)
HDLC SERPL-MCNC: 56 MG/DL
LDLC SERPL CALC-MCNC: 102 MG/DL (CALC)
MICROALBUMIN UR-MCNC: 1.9 MG/DL
NONHDLC SERPL-MCNC: 117 MG/DL (CALC)
POTASSIUM SERPL-SCNC: 4.2 MMOL/L (ref 3.5–5.3)
PROT SERPL-MCNC: 7.1 G/DL (ref 6.1–8.1)
SODIUM SERPL-SCNC: 144 MMOL/L (ref 135–146)
TRIGL SERPL-MCNC: 60 MG/DL
TSH SERPL-ACNC: 3.89 MIU/L (ref 0.4–4.5)

## 2025-04-16 NOTE — RESULT ENCOUNTER NOTE
Hi Mrs. Ragland.  Your blood work results showed slightly high bad cholesterol, and as we discussed I recommend to start statin medication, but we can wait for the results of cardiac score test and decide on that later.  Also, your vitamin D level is slightly low, please take vitamin D daily, the recommended dose if 5000 units in the winter and 2000 units in the summer.  Everything else is looking good.    Best,  Dr. Moore

## 2025-05-13 ENCOUNTER — APPOINTMENT (OUTPATIENT)
Dept: RADIOLOGY | Facility: CLINIC | Age: 75
End: 2025-05-13
Payer: COMMERCIAL

## 2025-05-21 ENCOUNTER — APPOINTMENT (OUTPATIENT)
Dept: RADIOLOGY | Facility: CLINIC | Age: 75
End: 2025-05-21
Payer: MEDICARE

## 2025-05-21 VITALS — BODY MASS INDEX: 27.66 KG/M2 | HEIGHT: 63 IN | WEIGHT: 156.09 LBS

## 2025-05-21 DIAGNOSIS — Z12.31 SCREENING MAMMOGRAM FOR BREAST CANCER: ICD-10-CM

## 2025-05-21 PROCEDURE — 77063 BREAST TOMOSYNTHESIS BI: CPT | Performed by: STUDENT IN AN ORGANIZED HEALTH CARE EDUCATION/TRAINING PROGRAM

## 2025-05-21 PROCEDURE — 77067 SCR MAMMO BI INCL CAD: CPT

## 2025-05-21 PROCEDURE — 77067 SCR MAMMO BI INCL CAD: CPT | Performed by: STUDENT IN AN ORGANIZED HEALTH CARE EDUCATION/TRAINING PROGRAM

## 2025-06-05 ENCOUNTER — APPOINTMENT (OUTPATIENT)
Dept: PRIMARY CARE | Facility: CLINIC | Age: 75
End: 2025-06-05
Payer: COMMERCIAL

## 2025-06-05 VITALS
HEART RATE: 88 BPM | BODY MASS INDEX: 29.44 KG/M2 | DIASTOLIC BLOOD PRESSURE: 88 MMHG | SYSTOLIC BLOOD PRESSURE: 172 MMHG | WEIGHT: 160 LBS | HEIGHT: 62 IN

## 2025-06-05 DIAGNOSIS — I10 HYPERTENSION, UNSPECIFIED TYPE: ICD-10-CM

## 2025-06-05 DIAGNOSIS — I51.7 LVH (LEFT VENTRICULAR HYPERTROPHY): ICD-10-CM

## 2025-06-05 DIAGNOSIS — E55.9 VITAMIN D DEFICIENCY: ICD-10-CM

## 2025-06-05 DIAGNOSIS — E78.49 OTHER HYPERLIPIDEMIA: Primary | ICD-10-CM

## 2025-06-05 RX ORDER — AMLODIPINE BESYLATE 10 MG/1
10 TABLET ORAL EVERY EVENING
Qty: 90 TABLET | Refills: 1 | Status: SHIPPED | OUTPATIENT
Start: 2025-06-05 | End: 2025-12-02

## 2025-06-05 ASSESSMENT — ACTIVITIES OF DAILY LIVING (ADL)
BATHING: INDEPENDENT
GROCERY_SHOPPING: INDEPENDENT
DOING_HOUSEWORK: INDEPENDENT
TAKING_MEDICATION: INDEPENDENT
MANAGING_FINANCES: INDEPENDENT
DRESSING: INDEPENDENT

## 2025-06-05 ASSESSMENT — PATIENT HEALTH QUESTIONNAIRE - PHQ9
1. LITTLE INTEREST OR PLEASURE IN DOING THINGS: NOT AT ALL
2. FEELING DOWN, DEPRESSED OR HOPELESS: NOT AT ALL
SUM OF ALL RESPONSES TO PHQ9 QUESTIONS 1 AND 2: 0
2. FEELING DOWN, DEPRESSED OR HOPELESS: NOT AT ALL
SUM OF ALL RESPONSES TO PHQ9 QUESTIONS 1 AND 2: 0
1. LITTLE INTEREST OR PLEASURE IN DOING THINGS: NOT AT ALL

## 2025-06-05 ASSESSMENT — ENCOUNTER SYMPTOMS
GASTROINTESTINAL NEGATIVE: 1
RESPIRATORY NEGATIVE: 1
CONSTITUTIONAL NEGATIVE: 1
CARDIOVASCULAR NEGATIVE: 1

## 2025-06-05 NOTE — PROGRESS NOTES
Chief Complaint:   Chief Complaint   Patient presents with    Follow-up      HPI    The patient is being seen for the subsequent annual wellness visit and follow up.  Past Medical, Surgical and Family History: reviewed and updated in chart.   Interval History: Patient has not been hospitalized previously.   Medications and Supplements: Review of all medications by a prescribing practitioner or clinical pharmacist (such as prescriptions, OTCs, herbal therapies and supplements) documented in the medical record.    No, the patient is not using opioids.   Health Risk Assessment:. Paper HRA completed by patient and scanned into chart.   Depression/Suicide Screening:  .   Done  No falls in the past year.  No recent hospitalizations.  Advance care planning completed.     Past Medical History   Medical History[1]   Past Surgical History:   Surgical History[2]  Family History:   Family History[3]  Social History:   Tobacco Use: Low Risk  (6/5/2025)    Patient History     Smoking Tobacco Use: Never     Smokeless Tobacco Use: Never     Passive Exposure: Never      Social History     Substance and Sexual Activity   Alcohol Use Not Currently    Comment: occ holidays        Allergies:   RX Allergies[4]     ROS   Review of Systems   Constitutional: Negative.    Respiratory: Negative.     Cardiovascular: Negative.    Gastrointestinal: Negative.         Objective   Vitals:    06/05/25 0803   BP: 172/88   Pulse: 88      BMI Readings from Last 15 Encounters:   06/05/25 29.26 kg/m²   05/21/25 28.08 kg/m²   04/15/25 28.08 kg/m²   01/03/25 29.52 kg/m²   11/01/24 28.80 kg/m²   06/10/24 28.98 kg/m²   09/26/23 27.03 kg/m²   03/06/23 28.38 kg/m²      72.6 kg (160 lb) (6/5/2025  8:03 AM)      Physical Exam  Physical Exam  Neurological:      Mental Status: She is alert.   Psychiatric:         Mood and Affect: Mood normal.          Labs:  CBC:  Recent Labs     09/20/24  1026 03/06/23  1557 06/21/21  1520   WBC 5.0 7.2 7.2   HGB 11.4* 11.2*  "11.5*   HCT 38.3 37.6 35.7*    246 229   MCV 95 95 89     CMP:  Recent Labs     04/15/25  0855 09/20/24  1026 09/26/23  0905    143 145   K 4.2 4.2 4.4    107 108*   CO2 29 29 29   ANIONGAP 8 11 12   BUN 16 20 21   CREATININE 0.74 0.82 0.80   EGFR 85 75  --    GLUCOSE 115* 102* 97     Recent Labs     04/15/25  0855 09/20/24  1026 09/26/23  0905   ALBUMIN 4.4 4.1 4.3   ALKPHOS 75 88 80   ALT 9 8 11   AST 14 15 15   BILITOT 0.7 0.8 0.7     Calcium/Phos:   Lab Results   Component Value Date    CALCIUM 9.5 04/15/2025      COAG: No results for input(s): \"INR\", \"DDIMERVTE\" in the last 33436 hours.  CRP: No results found for: \"CRP\"   [unfilled]   ENDO:  Recent Labs     04/15/25  0855 09/20/24  1026 03/06/23  1557   TSH 3.89  --  3.58   HGBA1C  --  4.9  --       CARDIAC:   Recent Labs     06/21/21  1520   BNP 24     Recent Labs     04/15/25  0855 03/06/23  1557   CHOL 173 166   LDLF  --  96   LDLCALC 102*  --    HDL 56 44.3   TRIG 60 130     No data recorded    Current Medications:  Current Medications[5]    Assessment and Plan  Betsy was seen today for follow-up.  Diagnoses and all orders for this visit:  Other hyperlipidemia (Primary)  Hypertension, unspecified type  -     amLODIPine (Norvasc) 10 mg tablet; Take 1 tablet (10 mg) by mouth once daily in the evening.  Vitamin D deficiency  LVH (left ventricular hypertrophy)       Immunizations:  Immunization History   Administered Date(s) Administered    Moderna SARS-CoV-2 Vaccination 06/19/2021     HTN  Poorly controlled  Not taking Chlorthalidone or Amlodipine  Only taking Losartan  Please start taking Amlodpine every night   Monitor BP at home BID  Bring the log to the office  Start taking vitamin D  The 10-year ASCVD risk score (Remy ELISE, et al., 2019) is: 20%    Values used to calculate the score:      Age: 74 years      Sex: Female      Is Non- : Yes      Diabetic: No      Tobacco smoker: No      Systolic Blood Pressure: " 172 mmHg      Is BP treated: Yes      HDL Cholesterol: 56 mg/dL      Total Cholesterol: 173 mg/dL  Recommend stating therapy  CT cardiac score pending  Follow up in 3 months  Consider echo      Medicare Wellness Billing Compliance Satisfied    *This is a visual tool to show completion of required items on the day of the visit. Green checks will only appear on the date of visit.    Review all medications by prescribing practitioner or clinical pharmacist (such as prescriptions, OTCs, herbal therapies and supplements) documented in the medical record    Past Medical, Surgical, and Family History reviewed and updated in chart    Tobacco Use Reviewed    Alcohol Use Reviewed    Illicit Drug Use Reviewed    PHQ2/9    Falls in Last Year Reviewed    Home Safety Risk Factors Reviewed    Cognitive Impairment Reviewed    Patient Self Assessment and Health Status    Current Diet Reviewed    Exercise Frequency    ADL - Hearing Impairment    ADL - Bathing    ADL - Dressing    ADL - Walks in Home    IADL - Managing Finances    IADL - Grocery Shopping    IADL - Taking Medications    IADL - Doing Housework    Vision Screening            [1] History reviewed. No pertinent past medical history.  [2] History reviewed. No pertinent surgical history.  [3] No family history on file.  [4]   Allergies  Allergen Reactions    Penicillin Swelling     Take nothing in the Idea Devicelin family.    Ultram [Tramadol] Anaphylaxis    Bactrim [Sulfamethoxazole-Trimethoprim] Hives and Fever    Codeine Hives and Swelling   [5]   Current Outpatient Medications   Medication Sig Dispense Refill    losartan (Cozaar) 50 mg tablet Take 1 tablet (50 mg) by mouth once daily. 30 tablet 1    amLODIPine (Norvasc) 10 mg tablet Take 1 tablet (10 mg) by mouth once daily in the evening. 90 tablet 1     No current facility-administered medications for this visit.      Number Of Freeze-Thaw Cycles: 2 freeze-thaw cycles Render Post-Care Instructions In Note?: yes Duration Of Freeze Thaw-Cycle (Seconds): 3 Post-Care Instructions: I reviewed with the patient in detail post-care instructions. Patient is to wear sunprotection, and avoid picking at any of the treated lesions. Pt may apply Vaseline to crusted or scabbing areas. Render Note In Bullet Format When Appropriate: No Consent: The patient's consent was obtained including but not limited to risks of crusting, scabbing, blistering, scarring, darker or lighter pigmentary change, recurrence, incomplete removal and infection. Detail Level: Zone

## 2025-06-18 ENCOUNTER — APPOINTMENT (OUTPATIENT)
Dept: PRIMARY CARE | Facility: CLINIC | Age: 75
End: 2025-06-18
Payer: MEDICARE

## 2025-08-12 DIAGNOSIS — I10 PRIMARY HYPERTENSION: ICD-10-CM

## 2025-08-12 RX ORDER — LOSARTAN POTASSIUM 50 MG/1
50 TABLET ORAL DAILY
Qty: 30 TABLET | Refills: 1 | Status: SHIPPED | OUTPATIENT
Start: 2025-08-12 | End: 2025-10-11

## 2025-09-04 ENCOUNTER — HOSPITAL ENCOUNTER (OUTPATIENT)
Dept: RADIOLOGY | Facility: CLINIC | Age: 75
Discharge: HOME | End: 2025-09-04
Payer: MEDICARE

## 2025-09-04 DIAGNOSIS — Z13.220 SCREENING FOR HYPERLIPIDEMIA: ICD-10-CM

## 2025-09-04 PROCEDURE — 75571 CT HRT W/O DYE W/CA TEST: CPT

## 2025-09-09 ENCOUNTER — APPOINTMENT (OUTPATIENT)
Dept: PRIMARY CARE | Facility: CLINIC | Age: 75
End: 2025-09-09
Payer: MEDICARE